# Patient Record
Sex: FEMALE | Race: WHITE
[De-identification: names, ages, dates, MRNs, and addresses within clinical notes are randomized per-mention and may not be internally consistent; named-entity substitution may affect disease eponyms.]

---

## 2018-08-29 ENCOUNTER — HOSPITAL ENCOUNTER (OUTPATIENT)
Dept: HOSPITAL 63 - RAD | Age: 28
Discharge: HOME | End: 2018-08-29
Attending: FAMILY MEDICINE
Payer: COMMERCIAL

## 2018-08-29 DIAGNOSIS — M79.671: Primary | ICD-10-CM

## 2018-08-29 PROCEDURE — 73630 X-RAY EXAM OF FOOT: CPT

## 2018-08-29 NOTE — RAD
EXAM: Right foot, 3 views.

 

HISTORY: Pain.

 

COMPARISON: None.

 

FINDINGS: 3 views of the right foot are obtained. There is no fracture, 

dislocation or subluxation. The alignment and joint spaces are 

unremarkable.

 

IMPRESSION: No acute osseous finding.

 

Electronically signed by: Sonja Pa MD (8/29/2018 4:28 PM) [facility]